# Patient Record
Sex: MALE | Race: WHITE | NOT HISPANIC OR LATINO | URBAN - METROPOLITAN AREA
[De-identification: names, ages, dates, MRNs, and addresses within clinical notes are randomized per-mention and may not be internally consistent; named-entity substitution may affect disease eponyms.]

---

## 2017-01-03 ENCOUNTER — OUTPATIENT (OUTPATIENT)
Dept: OUTPATIENT SERVICES | Facility: HOSPITAL | Age: 42
LOS: 1 days | End: 2017-01-03

## 2017-07-23 ENCOUNTER — EMERGENCY (EMERGENCY)
Facility: HOSPITAL | Age: 42
LOS: 1 days | End: 2017-07-23
Payer: COMMERCIAL

## 2017-07-23 PROCEDURE — 99284 EMERGENCY DEPT VISIT MOD MDM: CPT

## 2018-03-07 ENCOUNTER — APPOINTMENT (OUTPATIENT)
Dept: FAMILY MEDICINE | Facility: CLINIC | Age: 43
End: 2018-03-07

## 2018-03-08 ENCOUNTER — EMERGENCY (EMERGENCY)
Facility: HOSPITAL | Age: 43
LOS: 1 days | End: 2018-03-08
Payer: COMMERCIAL

## 2018-03-08 PROCEDURE — 99284 EMERGENCY DEPT VISIT MOD MDM: CPT

## 2018-05-17 ENCOUNTER — APPOINTMENT (OUTPATIENT)
Dept: FAMILY MEDICINE | Facility: CLINIC | Age: 43
End: 2018-05-17
Payer: COMMERCIAL

## 2018-05-17 VITALS
RESPIRATION RATE: 14 BRPM | OXYGEN SATURATION: 98 % | WEIGHT: 205 LBS | BODY MASS INDEX: 28.7 KG/M2 | SYSTOLIC BLOOD PRESSURE: 118 MMHG | DIASTOLIC BLOOD PRESSURE: 80 MMHG | HEART RATE: 90 BPM | HEIGHT: 71 IN

## 2018-05-17 DIAGNOSIS — Z86.79 PERSONAL HISTORY OF OTHER DISEASES OF THE CIRCULATORY SYSTEM: ICD-10-CM

## 2018-05-17 DIAGNOSIS — M50.10 CERVICAL DISC DISORDER WITH RADICULOPATHY, UNSPECIFIED CERVICAL REGION: ICD-10-CM

## 2018-05-17 DIAGNOSIS — M48.02 SPINAL STENOSIS, CERVICAL REGION: ICD-10-CM

## 2018-05-17 DIAGNOSIS — Z82.49 FAMILY HISTORY OF ISCHEMIC HEART DISEASE AND OTHER DISEASES OF THE CIRCULATORY SYSTEM: ICD-10-CM

## 2018-05-17 DIAGNOSIS — Z78.9 OTHER SPECIFIED HEALTH STATUS: ICD-10-CM

## 2018-05-17 PROCEDURE — 99243 OFF/OP CNSLTJ NEW/EST LOW 30: CPT

## 2018-05-17 RX ORDER — OMEPRAZOLE MAGNESIUM 20 MG/1
20 TABLET, DELAYED RELEASE ORAL DAILY
Refills: 0 | Status: ACTIVE | COMMUNITY

## 2018-05-17 NOTE — ASSESSMENT
[Patient Optimized for Surgery] : Patient optimized for surgery [No Further Testing Recommended] : no further testing recommended [As per surgery] : as per surgery [FreeTextEntry4] : Presurgical studies were all reviewed and are normal. Based on these studies and todays exam he is cleared for planned procedure.

## 2018-05-17 NOTE — PHYSICAL EXAM
[No Acute Distress] : no acute distress [Well Nourished] : well nourished [Well Developed] : well developed [Well-Appearing] : well-appearing [No JVD] : no jugular venous distention [Supple] : supple [No Lymphadenopathy] : no lymphadenopathy [No Respiratory Distress] : no respiratory distress  [Clear to Auscultation] : lungs were clear to auscultation bilaterally [Normal Rate] : normal rate  [Regular Rhythm] : with a regular rhythm [No Murmur] : no murmur heard [No Carotid Bruits] : no carotid bruits [Soft] : abdomen soft [Non Tender] : non-tender [No HSM] : no HSM [Normal Bowel Sounds] : normal bowel sounds [Normal Posterior Cervical Nodes] : no posterior cervical lymphadenopathy [Normal Anterior Cervical Nodes] : no anterior cervical lymphadenopathy [Speech Grossly Normal] : speech grossly normal [Memory Grossly Normal] : memory grossly normal [Normal Affect] : the affect was normal [Alert and Oriented x3] : oriented to person, place, and time [Normal Mood] : the mood was normal [Normal Insight/Judgement] : insight and judgment were intact [de-identified] : Neck - tender, limited painful rom

## 2018-05-17 NOTE — HISTORY OF PRESENT ILLNESS
[Atrial Fibrillation] : atrial fibrillation [Moderate (4-6 METs)] : Moderate (4-6 METs) [Diabetes] : no diabetes [Sleep Apnea] : no sleep apnea [COPD] : no COPD [Previous Adverse Anesthesia Reaction] : no previous adverse anesthesia reaction [FreeTextEntry1] : cervical discectomy and fusion [FreeTextEntry2] : 5/18/2018 [FreeTextEntry3] : Dr Julio C Boyce at [facility] [FreeTextEntry4] : pt presents for pre surgical clearance

## 2018-12-24 PROBLEM — M50.10 HERNIATION OF CERVICAL INTERVERTEBRAL DISC WITH RADICULOPATHY: Status: ACTIVE | Noted: 2018-05-17

## 2019-12-30 ENCOUNTER — APPOINTMENT (OUTPATIENT)
Dept: MRI IMAGING | Facility: CLINIC | Age: 44
End: 2019-12-30

## 2020-01-10 ENCOUNTER — APPOINTMENT (OUTPATIENT)
Dept: FAMILY MEDICINE | Facility: CLINIC | Age: 45
End: 2020-01-10

## 2021-08-05 ENCOUNTER — APPOINTMENT (OUTPATIENT)
Dept: INTERNAL MEDICINE | Facility: CLINIC | Age: 46
End: 2021-08-05

## 2021-09-02 ENCOUNTER — NON-APPOINTMENT (OUTPATIENT)
Age: 46
End: 2021-09-02

## 2021-09-02 ENCOUNTER — APPOINTMENT (OUTPATIENT)
Dept: FAMILY MEDICINE | Facility: CLINIC | Age: 46
End: 2021-09-02
Payer: COMMERCIAL

## 2021-09-02 VITALS
RESPIRATION RATE: 14 BRPM | DIASTOLIC BLOOD PRESSURE: 110 MMHG | HEART RATE: 70 BPM | OXYGEN SATURATION: 98 % | WEIGHT: 208 LBS | BODY MASS INDEX: 29.12 KG/M2 | TEMPERATURE: 98 F | SYSTOLIC BLOOD PRESSURE: 148 MMHG | HEIGHT: 71 IN

## 2021-09-02 VITALS — SYSTOLIC BLOOD PRESSURE: 150 MMHG | DIASTOLIC BLOOD PRESSURE: 86 MMHG

## 2021-09-02 DIAGNOSIS — K21.9 GASTRO-ESOPHAGEAL REFLUX DISEASE W/OUT ESOPHAGITIS: ICD-10-CM

## 2021-09-02 DIAGNOSIS — Z86.79 PERSONAL HISTORY OF OTHER DISEASES OF THE CIRCULATORY SYSTEM: ICD-10-CM

## 2021-09-02 DIAGNOSIS — R10.9 UNSPECIFIED ABDOMINAL PAIN: ICD-10-CM

## 2021-09-02 DIAGNOSIS — Z87.891 PERSONAL HISTORY OF NICOTINE DEPENDENCE: ICD-10-CM

## 2021-09-02 DIAGNOSIS — I10 ESSENTIAL (PRIMARY) HYPERTENSION: ICD-10-CM

## 2021-09-02 PROCEDURE — G0442 ANNUAL ALCOHOL SCREEN 15 MIN: CPT

## 2021-09-02 PROCEDURE — 99203 OFFICE O/P NEW LOW 30 MIN: CPT | Mod: 25

## 2021-09-02 RX ORDER — CYCLOBENZAPRINE HYDROCHLORIDE 10 MG/1
10 TABLET, FILM COATED ORAL
Qty: 10 | Refills: 0 | Status: ACTIVE | COMMUNITY
Start: 2021-09-02 | End: 1900-01-01

## 2021-09-02 RX ORDER — OXYCODONE HYDROCHLORIDE AND ACETAMINOPHEN 5; 325 MG/1; MG/1
5-325 TABLET ORAL
Refills: 0 | Status: DISCONTINUED | COMMUNITY
End: 2021-09-02

## 2021-09-02 NOTE — HISTORY OF PRESENT ILLNESS
[FreeTextEntry1] : patient presents for establish care\par patient also c/o right side pain above the hip X few months\par +pain, -constipated, -nauseous, - dizziness  [de-identified] : 47yo M presents for est care and right flank pain x 1 year\par \par c/o right flank pain ,    5/10   constant  , alleviated by  nothing  , aggravated by his worsening back  x 1 year \par \par denies n/v/blood in stool or black stool , diarrhea or constipation out of the ordinary, dysuria, frequency or urgency, fevers or chills

## 2021-09-02 NOTE — HEALTH RISK ASSESSMENT
[Yes] : Yes [4 or more  times a week (4 pts)] : 4 or more  times a week (4 points) [3 or 4 (1 pt)] : 3 or 4  (1 point) [Less than monthly (1 pt)] : Less than monthly (1 point) [No] : In the past 12 months have you used drugs other than those required for medical reasons? No [Audit-CScore] : 6

## 2021-09-02 NOTE — REVIEW OF SYSTEMS
[Fever] : no fever [Chills] : no chills [Chest Pain] : no chest pain [Shortness Of Breath] : no shortness of breath [Abdominal Pain] : no abdominal pain [Nausea] : no nausea [Constipation] : no constipation [Diarrhea] : no diarrhea [Vomiting] : no vomiting [Melena] : no melena [FreeTextEntry7] : +rightflank pain

## 2021-09-02 NOTE — PHYSICAL EXAM
[No Lymphadenopathy] : no lymphadenopathy [Supple] : supple [No Edema] : there was no peripheral edema [Normal] : soft, non-tender, non-distended, no masses palpated, no HSM and normal bowel sounds [No CVA Tenderness] : no CVA  tenderness [No Focal Deficits] : no focal deficits [Normal Affect] : the affect was normal [Normal Mood] : the mood was normal [Normal Insight/Judgement] : insight and judgment were intact [de-identified] : no calf tenderness b/l LE [de-identified] : neg murphys sign and no guarding or rigidty , no pain on palpation of RUQ [de-identified] : Full ROM of back b/l, no pain w/ flexion, extension, + pain w/  lateral rotation in right lower flank/ back,  no pain on palpation of spine or paraspinal muscles b/l

## 2021-09-02 NOTE — PLAN
[FreeTextEntry1] : \par est care\par \par HTN uncontrolled\par avoid salt\par will see cardio next week advisesd to be seen sooner for med adjustment, he has been on atenolol 50mg for 1 month he said, was on less previously, being workedup for arrhythmia, hx afib but resolved s/p ablation\par \par right flank pain r/o kidney stones vs gallstones vs muscular\par -Heating pad prn, advised do not sleep with it on\par -Trial of flexeril at bedtime prn, advised do not drive after use\par advised no alcohol with flexeril \par us abd r/o kidney stones vs gallstones if no findings and no relief with muscle relaxer will do ct stone hunt\par diary to see if symptoms with certain foods\par \par f/u 2 weeks or sooner if worsening pt understood and agreed w/plan \par 
negative...

## 2022-01-13 RX ORDER — ATENOLOL 50 MG/1
50 TABLET ORAL DAILY
Qty: 60 | Refills: 0 | Status: DISCONTINUED | COMMUNITY
Start: 2021-09-02 | End: 2022-01-13

## 2022-04-06 DIAGNOSIS — Z01.818 ENCOUNTER FOR OTHER PREPROCEDURAL EXAMINATION: ICD-10-CM

## 2022-04-06 DIAGNOSIS — Z76.89 PERSONS ENCOUNTERING HEALTH SERVICES IN OTHER SPECIFIED CIRCUMSTANCES: ICD-10-CM

## 2022-04-06 RX ORDER — CARVEDILOL 12.5 MG/1
12.5 TABLET, FILM COATED ORAL
Qty: 60 | Refills: 0 | Status: ACTIVE | COMMUNITY
Start: 2022-01-13

## 2022-04-06 RX ORDER — APIXABAN 5 MG/1
5 TABLET, FILM COATED ORAL
Qty: 180 | Refills: 0 | Status: ACTIVE | COMMUNITY
Start: 2022-04-06

## 2022-04-06 RX ORDER — LOSARTAN POTASSIUM 25 MG/1
25 TABLET, FILM COATED ORAL
Qty: 90 | Refills: 0 | Status: ACTIVE | COMMUNITY
Start: 2022-04-06

## 2022-04-07 ENCOUNTER — APPOINTMENT (OUTPATIENT)
Dept: ORTHOPEDIC SURGERY | Facility: CLINIC | Age: 47
End: 2022-04-07

## 2022-06-17 ENCOUNTER — EMERGENCY (EMERGENCY)
Facility: HOSPITAL | Age: 47
LOS: 1 days | Discharge: HOME | End: 2022-06-17
Admitting: EMERGENCY MEDICINE
Payer: COMMERCIAL

## 2022-06-17 DIAGNOSIS — R10.9 UNSPECIFIED ABDOMINAL PAIN: ICD-10-CM

## 2022-06-17 PROCEDURE — L9991: CPT

## 2022-08-26 ENCOUNTER — OUTPATIENT (OUTPATIENT)
Dept: OUTPATIENT SERVICES | Facility: HOSPITAL | Age: 47
LOS: 1 days | Discharge: HOME | End: 2022-08-26

## 2022-08-26 PROCEDURE — 93010 ELECTROCARDIOGRAM REPORT: CPT

## 2022-09-05 DIAGNOSIS — I48.91 UNSPECIFIED ATRIAL FIBRILLATION: ICD-10-CM

## 2022-09-05 DIAGNOSIS — Z53.09 PROCEDURE AND TREATMENT NOT CARRIED OUT BECAUSE OF OTHER CONTRAINDICATION: ICD-10-CM
